# Patient Record
Sex: FEMALE | Race: WHITE | NOT HISPANIC OR LATINO | Employment: UNEMPLOYED | URBAN - METROPOLITAN AREA
[De-identification: names, ages, dates, MRNs, and addresses within clinical notes are randomized per-mention and may not be internally consistent; named-entity substitution may affect disease eponyms.]

---

## 2023-06-16 ENCOUNTER — HOSPITAL ENCOUNTER (EMERGENCY)
Facility: CLINIC | Age: 43
Discharge: LEFT BEFORE DISCHARGE | End: 2023-06-16
Attending: EMERGENCY MEDICINE | Admitting: EMERGENCY MEDICINE
Payer: COMMERCIAL

## 2023-06-16 ENCOUNTER — HOSPITAL ENCOUNTER (EMERGENCY)
Facility: CLINIC | Age: 43
Discharge: HOME | End: 2023-06-16
Attending: EMERGENCY MEDICINE | Admitting: EMERGENCY MEDICINE
Payer: COMMERCIAL

## 2023-06-16 VITALS
DIASTOLIC BLOOD PRESSURE: 91 MMHG | OXYGEN SATURATION: 98 % | SYSTOLIC BLOOD PRESSURE: 158 MMHG | HEART RATE: 115 BPM | TEMPERATURE: 98.9 F | RESPIRATION RATE: 18 BRPM

## 2023-06-16 VITALS
BODY MASS INDEX: 22.19 KG/M2 | SYSTOLIC BLOOD PRESSURE: 140 MMHG | WEIGHT: 155 LBS | HEART RATE: 73 BPM | HEIGHT: 70 IN | TEMPERATURE: 98 F | DIASTOLIC BLOOD PRESSURE: 112 MMHG | RESPIRATION RATE: 16 BRPM | OXYGEN SATURATION: 100 %

## 2023-06-16 DIAGNOSIS — M54.50 ACUTE BILATERAL LOW BACK PAIN WITHOUT SCIATICA: ICD-10-CM

## 2023-06-16 DIAGNOSIS — Z53.21 ELOPED FROM EMERGENCY DEPARTMENT: ICD-10-CM

## 2023-06-16 LAB
ALBUMIN UR-MCNC: NEGATIVE MG/DL
APPEARANCE UR: CLEAR
BILIRUB UR QL STRIP: NEGATIVE
COLOR UR AUTO: NORMAL
GLUCOSE UR STRIP-MCNC: NEGATIVE MG/DL
HGB UR QL STRIP: NEGATIVE
KETONES UR STRIP-MCNC: NEGATIVE MG/DL
LEUKOCYTE ESTERASE UR QL STRIP: NEGATIVE
NITRATE UR QL: NEGATIVE
PH UR STRIP: 6.5 [PH] (ref 5–7)
RBC URINE: <1 /HPF
SP GR UR STRIP: 1 (ref 1–1.03)
UROBILINOGEN UR STRIP-MCNC: NORMAL MG/DL
WBC URINE: <1 /HPF

## 2023-06-16 PROCEDURE — 81001 URINALYSIS AUTO W/SCOPE: CPT | Performed by: EMERGENCY MEDICINE

## 2023-06-16 PROCEDURE — 99281 EMR DPT VST MAYX REQ PHY/QHP: CPT

## 2023-06-16 PROCEDURE — 99282 EMERGENCY DEPT VISIT SF MDM: CPT

## 2023-06-16 ASSESSMENT — ACTIVITIES OF DAILY LIVING (ADL): ADLS_ACUITY_SCORE: 33

## 2023-06-16 NOTE — ED TRIAGE NOTES
Pt is a client at [facility]  Pt has not for 5 days   Pt has been at [facility] for 4 days     Pt has back pain

## 2023-06-17 NOTE — ED PROVIDER NOTES
History     Chief Complaint:  Back Pain       HPI   Blossom Shoemaker is a 43 year old female who presents due to back pain. Blossom states that earlier this morning she came in to the ED due to back pain rated at a 7/10. She eventually left because her son came to visit her and didn't have a finished workup. She was told that she needed to be medically cleared to return, so she came back to the ED to obtain AVS. During evaluation, Blossom reports that her back pain has gone down and is currently at a 4/10. She states that her pain is located to her mid to lower back and she thinks it is likely due to the bed at her facility. She currently denies any radiation of the pain down her legs, fever, abdominal pain, or difficulty urinating.     Independent Historian:   None - Patient Only    Review of External Notes:   I reviewed the patient's lab results from earlier this morning, see below.      UA results from 6/16 at 1141  Glucose -    Negative  Ketones -    Negative  Specific gravity -   1.004  pH urine -    6.5  Protein albumin -   Negative   Leukocyte esterase -   Negative     Medications:    Adderall  Escitalopram     Past Medical History:    No pertinent past medical history     Physical Exam     Patient Vitals for the past 24 hrs:   BP Temp Temp src Pulse Resp SpO2   06/16/23 2235 (!) 158/91 -- -- 115 18 98 %   06/16/23 2234 -- 98.9  F (37.2  C) Temporal -- -- 98 %        Physical Exam  General: Standing up in triage room  Eyes:  The pupils are equal and round    Conjunctivae and sclerae are normal  ENT:    Wearing a mask  Neck:  Normal range of motion  CV:  Regular rate     Skin warm and well perfused   Resp:  Non labored breathing on room air    No tachypnea    No cough heard  GI:  Abdomen is soft, there is no rigidity    No distension    No rebound tenderness     No abdominal tenderness  MS:  Mild tenderness on musculature of back  Skin:  No rash or acute skin lesions noted  Neuro:   Awake, alert.      Speech is  normal and fluent.    Face is symmetric.     Moves all extremities equally    Gait normal  Psych: Normal affect.  Appropriate interactions.    Emergency Department Course       Emergency Department Course & Assessments:       Assessments:  2310 I obtained history and examined the patient as noted above. At this time, the patient was deemed safe to discharge home and she agreed to the plan of care.    Independent Interpretation (X-rays, CTs, rhythm strip):  None    Consultations/Discussion of Management or Tests:  None        Disposition:  The patient was discharged to home.     Impression & Plan    CMS Diagnoses: None    Medical Decision Making:  The patient presents for back pain.  Based on history and exam, this is consistent with musculoskeletal back pain.  The pain is not radicular in nature and the patient has no paresthesias. No indication for MRI of spine. There are no red flag symptoms to suggest acute neurologic emergency, acute spinal cord or nerve root compression or cuada equina syndrome.  I do not suspect referred pain from an intraabdominal or urologic process. A UA was obtained earlier this morning to rule out UTI- this was negative. Denies concern for being pregnant, has IUD. Patient mainly came back to ED to get a note to be able to return to her treatment facility as she feels much better and does not think further testing or workup is indicated.    Diagnosis:    ICD-10-CM    1. Acute bilateral low back pain without sciatica  M54.50          Scribe Disclosure:  I, Sherie Parkinson, am serving as a scribe at 10:45 PM on 6/16/2023 to document services personally performed by Desiree Manriquez MD based on my observations and the provider's statements to me.     6/16/2023   Desiree Manriquez MD Goertz, Maria Kristine, MD  06/16/23 5110

## 2023-06-17 NOTE — ED TRIAGE NOTES
Patient presents to ED again after eloping earlier today prior to seeing provider due to ride issues. Was triaged for back pain, UA was negative. States she thinks her back pain is due to sleeping on the uncomfortable beds at the treatment facility she is staying at. She states her back pain is improved now. Patient requesting to be evaluated so she can have an AVS that states she is medically cleared to return to treatment facility.